# Patient Record
Sex: FEMALE | Race: OTHER | Employment: FULL TIME | URBAN - METROPOLITAN AREA
[De-identification: names, ages, dates, MRNs, and addresses within clinical notes are randomized per-mention and may not be internally consistent; named-entity substitution may affect disease eponyms.]

---

## 2022-03-16 ENCOUNTER — APPOINTMENT (OUTPATIENT)
Dept: ULTRASOUND IMAGING | Facility: HOSPITAL | Age: 22
End: 2022-03-16
Attending: EMERGENCY MEDICINE
Payer: OTHER GOVERNMENT

## 2022-03-16 ENCOUNTER — HOSPITAL ENCOUNTER (EMERGENCY)
Facility: HOSPITAL | Age: 22
Discharge: HOME | End: 2022-03-16
Attending: EMERGENCY MEDICINE
Payer: OTHER GOVERNMENT

## 2022-03-16 VITALS
DIASTOLIC BLOOD PRESSURE: 68 MMHG | WEIGHT: 150 LBS | TEMPERATURE: 97 F | RESPIRATION RATE: 18 BRPM | SYSTOLIC BLOOD PRESSURE: 116 MMHG | OXYGEN SATURATION: 99 % | HEART RATE: 65 BPM

## 2022-03-16 DIAGNOSIS — O03.9 MISCARRIAGE: Primary | ICD-10-CM

## 2022-03-16 LAB
ALBUMIN SERPL-MCNC: 3.5 G/DL (ref 3.4–5)
ALBUMIN/GLOB SERPL: 0.9 {RATIO} (ref 1–2)
ALP LIVER SERPL-CCNC: 69 U/L
ALT SERPL-CCNC: 33 U/L
ANION GAP SERPL CALC-SCNC: 4 MMOL/L (ref 0–18)
ANTIBODY SCREEN: NEGATIVE
AST SERPL-CCNC: 19 U/L (ref 15–37)
B-HCG SERPL-ACNC: 1038 MIU/ML
BASOPHILS # BLD AUTO: 0.06 X10(3) UL (ref 0–0.2)
BASOPHILS NFR BLD AUTO: 0.5 %
BILIRUB SERPL-MCNC: 0.2 MG/DL (ref 0.1–2)
BUN BLD-MCNC: 14 MG/DL (ref 7–18)
CALCIUM BLD-MCNC: 8.7 MG/DL (ref 8.5–10.1)
CHLORIDE SERPL-SCNC: 106 MMOL/L (ref 98–112)
CO2 SERPL-SCNC: 27 MMOL/L (ref 21–32)
CREAT BLD-MCNC: 0.8 MG/DL
EOSINOPHIL # BLD AUTO: 0.24 X10(3) UL (ref 0–0.7)
EOSINOPHIL NFR BLD AUTO: 2 %
ERYTHROCYTE [DISTWIDTH] IN BLOOD BY AUTOMATED COUNT: 12.5 %
GLOBULIN PLAS-MCNC: 3.7 G/DL (ref 2.8–4.4)
GLUCOSE BLD-MCNC: 108 MG/DL (ref 70–99)
HCT VFR BLD AUTO: 38 %
HGB BLD-MCNC: 13.3 G/DL
IMM GRANULOCYTES # BLD AUTO: 0.04 X10(3) UL (ref 0–1)
IMM GRANULOCYTES NFR BLD: 0.3 %
LYMPHOCYTES # BLD AUTO: 2.24 X10(3) UL (ref 1–4)
LYMPHOCYTES NFR BLD AUTO: 18.8 %
MCH RBC QN AUTO: 31.2 PG (ref 26–34)
MCHC RBC AUTO-ENTMCNC: 35 G/DL (ref 31–37)
MCV RBC AUTO: 89.2 FL
MONOCYTES # BLD AUTO: 0.69 X10(3) UL (ref 0.1–1)
MONOCYTES NFR BLD AUTO: 5.8 %
NEUTROPHILS # BLD AUTO: 8.66 X10 (3) UL (ref 1.5–7.7)
NEUTROPHILS # BLD AUTO: 8.66 X10(3) UL (ref 1.5–7.7)
NEUTROPHILS NFR BLD AUTO: 72.6 %
OSMOLALITY SERPL CALC.SUM OF ELEC: 285 MOSM/KG (ref 275–295)
PLATELET # BLD AUTO: 324 10(3)UL (ref 150–450)
POTASSIUM SERPL-SCNC: 3.8 MMOL/L (ref 3.5–5.1)
PROT SERPL-MCNC: 7.2 G/DL (ref 6.4–8.2)
RBC # BLD AUTO: 4.26 X10(6)UL
SODIUM SERPL-SCNC: 137 MMOL/L (ref 136–145)
WBC # BLD AUTO: 11.9 X10(3) UL (ref 4–11)

## 2022-03-16 PROCEDURE — 85025 COMPLETE CBC W/AUTO DIFF WBC: CPT | Performed by: EMERGENCY MEDICINE

## 2022-03-16 PROCEDURE — 86901 BLOOD TYPING SEROLOGIC RH(D): CPT | Performed by: EMERGENCY MEDICINE

## 2022-03-16 PROCEDURE — 80053 COMPREHEN METABOLIC PANEL: CPT | Performed by: EMERGENCY MEDICINE

## 2022-03-16 PROCEDURE — 99284 EMERGENCY DEPT VISIT MOD MDM: CPT

## 2022-03-16 PROCEDURE — 86850 RBC ANTIBODY SCREEN: CPT | Performed by: EMERGENCY MEDICINE

## 2022-03-16 PROCEDURE — 86900 BLOOD TYPING SEROLOGIC ABO: CPT | Performed by: EMERGENCY MEDICINE

## 2022-03-16 PROCEDURE — 84702 CHORIONIC GONADOTROPIN TEST: CPT | Performed by: EMERGENCY MEDICINE

## 2022-03-16 PROCEDURE — 76817 TRANSVAGINAL US OBSTETRIC: CPT | Performed by: EMERGENCY MEDICINE

## 2022-03-16 PROCEDURE — 76801 OB US < 14 WKS SINGLE FETUS: CPT | Performed by: EMERGENCY MEDICINE

## 2022-03-16 PROCEDURE — 36415 COLL VENOUS BLD VENIPUNCTURE: CPT

## 2022-03-16 RX ORDER — PANTOPRAZOLE SODIUM 40 MG/1
40 TABLET, DELAYED RELEASE ORAL
COMMUNITY

## 2022-03-16 NOTE — ED INITIAL ASSESSMENT (HPI)
A/O x 4. Patient is approx 10 weeks pregnant. Presents with abdominal cramping and vaginal bleeding since midnight.   Hx of miscarriage

## 2022-03-29 ENCOUNTER — TELEPHONE (OUTPATIENT)
Dept: OBGYN UNIT | Facility: HOSPITAL | Age: 22
End: 2022-03-29

## 2022-12-22 ENCOUNTER — PATIENT MESSAGE (OUTPATIENT)
Dept: FAMILY MEDICINE CLINIC | Facility: CLINIC | Age: 22
End: 2022-12-22

## 2022-12-22 ENCOUNTER — TELEMEDICINE (OUTPATIENT)
Dept: FAMILY MEDICINE CLINIC | Facility: CLINIC | Age: 22
End: 2022-12-22
Payer: OTHER GOVERNMENT

## 2022-12-22 DIAGNOSIS — G47.33 OBSTRUCTIVE SLEEP APNEA: Primary | ICD-10-CM

## 2022-12-22 DIAGNOSIS — K21.9 GASTROESOPHAGEAL REFLUX DISEASE WITHOUT ESOPHAGITIS: ICD-10-CM

## 2022-12-22 PROCEDURE — 99214 OFFICE O/P EST MOD 30 MIN: CPT | Performed by: NURSE PRACTITIONER

## 2022-12-23 NOTE — TELEPHONE ENCOUNTER
From: Nuvia Denton  To: RONALDO Medrano  Sent: 12/22/2022 11:43 AM CST  Subject: Sleep Study Results For Madyson Chappell    Please see the attached requested document. Thank you for your time!

## 2023-01-04 NOTE — PATIENT INSTRUCTIONS
Get a copy of sleep study. Consider auto therapy, if insurance doesn't cover titration study. Warned if still with sleep apnea and not using CPAP has 7 fold increased risk and heart attack, stroke, abnormal heart rhythm, and death. Increased risk of driving accidents. Advised to refrain from driving when sleepy.

## 2023-01-17 ENCOUNTER — LAB ENCOUNTER (OUTPATIENT)
Dept: LAB | Age: 23
End: 2023-01-17
Attending: NURSE PRACTITIONER
Payer: OTHER GOVERNMENT

## 2023-01-17 DIAGNOSIS — Z01.818 PREOPERATIVE EXAMINATION, UNSPECIFIED: ICD-10-CM

## 2023-01-17 DIAGNOSIS — Z11.59 SCREENING FOR VIRAL DISEASE: ICD-10-CM

## 2023-01-18 LAB — SARS-COV-2 RNA RESP QL NAA+PROBE: NOT DETECTED

## 2023-01-20 ENCOUNTER — OFFICE VISIT (OUTPATIENT)
Dept: SLEEP CENTER | Age: 23
End: 2023-01-20
Attending: NURSE PRACTITIONER
Payer: OTHER GOVERNMENT

## 2023-01-20 DIAGNOSIS — K21.9 GASTROESOPHAGEAL REFLUX DISEASE WITHOUT ESOPHAGITIS: ICD-10-CM

## 2023-01-20 DIAGNOSIS — G47.33 OBSTRUCTIVE SLEEP APNEA: ICD-10-CM

## 2023-01-20 PROCEDURE — 95811 POLYSOM 6/>YRS CPAP 4/> PARM: CPT

## 2023-01-23 ENCOUNTER — SLEEP STUDY (OUTPATIENT)
Dept: FAMILY MEDICINE CLINIC | Facility: CLINIC | Age: 23
End: 2023-01-23
Payer: OTHER GOVERNMENT

## 2023-01-23 DIAGNOSIS — G47.33 OSA (OBSTRUCTIVE SLEEP APNEA): Primary | ICD-10-CM

## 2023-01-23 PROCEDURE — 95811 POLYSOM 6/>YRS CPAP 4/> PARM: CPT | Performed by: FAMILY MEDICINE

## 2023-01-24 ENCOUNTER — TELEPHONE (OUTPATIENT)
Dept: FAMILY MEDICINE CLINIC | Facility: CLINIC | Age: 23
End: 2023-01-24

## 2023-01-24 DIAGNOSIS — G47.33 OSA (OBSTRUCTIVE SLEEP APNEA): Primary | ICD-10-CM

## 2023-01-24 NOTE — TELEPHONE ENCOUNTER
----- Message from Valorie Morin MD sent at 1/24/2023  9:25 AM CST -----  [place] PAP  Update Flow sheet. Notify pt to:   Call DME supplier to obtain machine. Recommend routine follow up to assure compliance and efficacy. Avoid alcohol, sedatives and other cns depressants that may worsen sleep apnea and disrupt normal sleep architecture. Sleep hygiene should be review to assess factors that may improve sleep quality. Weight management and regular exercise should be initiated or continued to optimal BMI. Return to Sleep Physician 2 weeks after starting CPAP to evaluate progress. Avoid dangerous activities such as driving or working heavy machinery during periods of  fatigue.

## 2023-01-27 ENCOUNTER — OFFICE VISIT (OUTPATIENT)
Dept: OBGYN CLINIC | Facility: CLINIC | Age: 23
End: 2023-01-27
Payer: OTHER GOVERNMENT

## 2023-01-27 VITALS
SYSTOLIC BLOOD PRESSURE: 118 MMHG | WEIGHT: 191.81 LBS | DIASTOLIC BLOOD PRESSURE: 83 MMHG | HEART RATE: 73 BPM | BODY MASS INDEX: 32.74 KG/M2 | HEIGHT: 64 IN

## 2023-01-27 DIAGNOSIS — Z01.419 WELL WOMAN EXAM WITH ROUTINE GYNECOLOGICAL EXAM: Primary | ICD-10-CM

## 2023-01-27 DIAGNOSIS — E28.2 PCOS (POLYCYSTIC OVARIAN SYNDROME): ICD-10-CM

## 2023-01-27 PROCEDURE — 3008F BODY MASS INDEX DOCD: CPT

## 2023-01-27 PROCEDURE — 3079F DIAST BP 80-89 MM HG: CPT

## 2023-01-27 PROCEDURE — 99385 PREV VISIT NEW AGE 18-39: CPT

## 2023-01-27 PROCEDURE — 3074F SYST BP LT 130 MM HG: CPT

## 2023-01-27 PROCEDURE — 87591 N.GONORRHOEAE DNA AMP PROB: CPT

## 2023-01-27 PROCEDURE — 87491 CHLMYD TRACH DNA AMP PROBE: CPT

## 2023-01-28 ENCOUNTER — LABORATORY ENCOUNTER (OUTPATIENT)
Dept: LAB | Age: 23
End: 2023-01-28
Payer: OTHER GOVERNMENT

## 2023-01-28 DIAGNOSIS — Z01.419 WELL WOMAN EXAM WITH ROUTINE GYNECOLOGICAL EXAM: ICD-10-CM

## 2023-01-28 DIAGNOSIS — E28.2 PCOS (POLYCYSTIC OVARIAN SYNDROME): ICD-10-CM

## 2023-01-28 LAB
ALBUMIN SERPL-MCNC: 4.1 G/DL (ref 3.4–5)
ALBUMIN/GLOB SERPL: 1 {RATIO} (ref 1–2)
ALP LIVER SERPL-CCNC: 72 U/L
ALT SERPL-CCNC: 34 U/L
ANION GAP SERPL CALC-SCNC: 5 MMOL/L (ref 0–18)
AST SERPL-CCNC: 18 U/L (ref 15–37)
BASOPHILS # BLD AUTO: 0.05 X10(3) UL (ref 0–0.2)
BASOPHILS NFR BLD AUTO: 0.5 %
BILIRUB SERPL-MCNC: 0.5 MG/DL (ref 0.1–2)
BUN BLD-MCNC: 13 MG/DL (ref 7–18)
CALCIUM BLD-MCNC: 9.4 MG/DL (ref 8.5–10.1)
CHLORIDE SERPL-SCNC: 105 MMOL/L (ref 98–112)
CHOLEST SERPL-MCNC: 188 MG/DL (ref ?–200)
CO2 SERPL-SCNC: 25 MMOL/L (ref 21–32)
CREAT BLD-MCNC: 0.84 MG/DL
EOSINOPHIL # BLD AUTO: 0.22 X10(3) UL (ref 0–0.7)
EOSINOPHIL NFR BLD AUTO: 2.3 %
ERYTHROCYTE [DISTWIDTH] IN BLOOD BY AUTOMATED COUNT: 12.1 %
FASTING PATIENT LIPID ANSWER: YES
FASTING STATUS PATIENT QL REPORTED: YES
FSH SERPL-ACNC: 6.6 MIU/ML
GFR SERPLBLD BASED ON 1.73 SQ M-ARVRAT: 101 ML/MIN/1.73M2 (ref 60–?)
GLOBULIN PLAS-MCNC: 4 G/DL (ref 2.8–4.4)
GLUCOSE BLD-MCNC: 99 MG/DL (ref 70–99)
HCT VFR BLD AUTO: 42 %
HDLC SERPL-MCNC: 39 MG/DL (ref 40–59)
HGB BLD-MCNC: 14.5 G/DL
IMM GRANULOCYTES # BLD AUTO: 0.03 X10(3) UL (ref 0–1)
IMM GRANULOCYTES NFR BLD: 0.3 %
LDLC SERPL CALC-MCNC: 114 MG/DL (ref ?–100)
LH SERPL-ACNC: 16.5 MIU/ML
LYMPHOCYTES # BLD AUTO: 2.56 X10(3) UL (ref 1–4)
LYMPHOCYTES NFR BLD AUTO: 27.2 %
MCH RBC QN AUTO: 30.8 PG (ref 26–34)
MCHC RBC AUTO-ENTMCNC: 34.5 G/DL (ref 31–37)
MCV RBC AUTO: 89.2 FL
MONOCYTES # BLD AUTO: 0.49 X10(3) UL (ref 0.1–1)
MONOCYTES NFR BLD AUTO: 5.2 %
NEUTROPHILS # BLD AUTO: 6.06 X10 (3) UL (ref 1.5–7.7)
NEUTROPHILS # BLD AUTO: 6.06 X10(3) UL (ref 1.5–7.7)
NEUTROPHILS NFR BLD AUTO: 64.5 %
NONHDLC SERPL-MCNC: 149 MG/DL (ref ?–130)
OSMOLALITY SERPL CALC.SUM OF ELEC: 280 MOSM/KG (ref 275–295)
PLATELET # BLD AUTO: 401 10(3)UL (ref 150–450)
POTASSIUM SERPL-SCNC: 4.1 MMOL/L (ref 3.5–5.1)
PROLACTIN SERPL-MCNC: 14 NG/ML
PROT SERPL-MCNC: 8.1 G/DL (ref 6.4–8.2)
RBC # BLD AUTO: 4.71 X10(6)UL
SODIUM SERPL-SCNC: 135 MMOL/L (ref 136–145)
TRIGL SERPL-MCNC: 199 MG/DL (ref 30–149)
TSI SER-ACNC: 1.54 MIU/ML (ref 0.36–3.74)
VIT D+METAB SERPL-MCNC: 15.7 NG/ML (ref 30–100)
VLDLC SERPL CALC-MCNC: 35 MG/DL (ref 0–30)
WBC # BLD AUTO: 9.4 X10(3) UL (ref 4–11)

## 2023-01-28 PROCEDURE — 83002 ASSAY OF GONADOTROPIN (LH): CPT

## 2023-01-28 PROCEDURE — 84146 ASSAY OF PROLACTIN: CPT

## 2023-01-28 PROCEDURE — 84402 ASSAY OF FREE TESTOSTERONE: CPT

## 2023-01-28 PROCEDURE — 80061 LIPID PANEL: CPT

## 2023-01-28 PROCEDURE — 83001 ASSAY OF GONADOTROPIN (FSH): CPT

## 2023-01-28 PROCEDURE — 80050 GENERAL HEALTH PANEL: CPT

## 2023-01-28 PROCEDURE — 84403 ASSAY OF TOTAL TESTOSTERONE: CPT

## 2023-01-28 PROCEDURE — 82306 VITAMIN D 25 HYDROXY: CPT

## 2023-01-30 LAB
C TRACH DNA SPEC QL NAA+PROBE: NEGATIVE
N GONORRHOEA DNA SPEC QL NAA+PROBE: NEGATIVE

## 2023-02-03 LAB
SEX HORMONE BINDING GLOBULIN: 13 NMOL/L
TESTOSTERONE -MS, BIOAVAILAB: 19.7 NG/DL
TESTOSTERONE, -MS/MS: 28 NG/DL
TESTOSTERONE, FREE -MS/MS: 6.5 PG/ML

## 2023-04-17 ENCOUNTER — OFFICE VISIT (OUTPATIENT)
Dept: FAMILY MEDICINE CLINIC | Facility: CLINIC | Age: 23
End: 2023-04-17
Payer: OTHER GOVERNMENT

## 2023-04-17 VITALS
SYSTOLIC BLOOD PRESSURE: 124 MMHG | HEART RATE: 84 BPM | BODY MASS INDEX: 31.76 KG/M2 | DIASTOLIC BLOOD PRESSURE: 78 MMHG | OXYGEN SATURATION: 98 % | RESPIRATION RATE: 16 BRPM | HEIGHT: 64 IN | WEIGHT: 186 LBS

## 2023-04-17 DIAGNOSIS — Z71.3 DIETARY COUNSELING: ICD-10-CM

## 2023-04-17 DIAGNOSIS — R35.0 URINARY FREQUENCY: ICD-10-CM

## 2023-04-17 DIAGNOSIS — E28.2 PCOS (POLYCYSTIC OVARIAN SYNDROME): Primary | ICD-10-CM

## 2023-04-17 PROBLEM — K63.5 POLYP OF COLON: Status: ACTIVE | Noted: 2019-06-18

## 2023-04-17 PROBLEM — K29.30 CHRONIC SUPERFICIAL GASTRITIS WITHOUT BLEEDING: Status: ACTIVE | Noted: 2019-07-07

## 2023-04-17 PROBLEM — K31.7 GASTRIC POLYP: Status: ACTIVE | Noted: 2019-07-07

## 2023-04-17 PROBLEM — K21.9 GASTROESOPHAGEAL REFLUX DISEASE: Status: ACTIVE | Noted: 2021-09-14

## 2023-04-17 LAB
APPEARANCE: CLEAR
BILIRUBIN: NEGATIVE
CARTRIDGE LOT#: NORMAL NUMERIC
GLUCOSE (URINE DIPSTICK): NEGATIVE MG/DL
HEMOGLOBIN A1C: 5.4 % (ref 4.3–5.6)
KETONES (URINE DIPSTICK): NEGATIVE MG/DL
LEUKOCYTES: NEGATIVE
MULTISTIX LOT#: NORMAL NUMERIC
NITRITE, URINE: NEGATIVE
OCCULT BLOOD: NEGATIVE
PH, URINE: 6 (ref 4.5–8)
PROTEIN (URINE DIPSTICK): NEGATIVE MG/DL
SPECIFIC GRAVITY: 1.02 (ref 1–1.03)
URINE-COLOR: YELLOW
UROBILINOGEN,SEMI-QN: 0.2 MG/DL (ref 0–1.9)

## 2023-04-17 PROCEDURE — 3074F SYST BP LT 130 MM HG: CPT | Performed by: PHYSICIAN ASSISTANT

## 2023-04-17 PROCEDURE — 3078F DIAST BP <80 MM HG: CPT | Performed by: PHYSICIAN ASSISTANT

## 2023-04-17 PROCEDURE — 99203 OFFICE O/P NEW LOW 30 MIN: CPT | Performed by: PHYSICIAN ASSISTANT

## 2023-04-17 PROCEDURE — 81003 URINALYSIS AUTO W/O SCOPE: CPT | Performed by: PHYSICIAN ASSISTANT

## 2023-04-17 PROCEDURE — 3008F BODY MASS INDEX DOCD: CPT | Performed by: PHYSICIAN ASSISTANT

## 2023-04-17 PROCEDURE — 83036 HEMOGLOBIN GLYCOSYLATED A1C: CPT | Performed by: PHYSICIAN ASSISTANT

## 2023-04-17 RX ORDER — TOPIRAMATE 25 MG/1
TABLET ORAL
COMMUNITY
Start: 2023-01-29 | End: 2023-04-17 | Stop reason: ALTCHOICE

## 2023-04-17 RX ORDER — PRAZOSIN HYDROCHLORIDE 1 MG/1
CAPSULE ORAL
COMMUNITY
Start: 2023-01-29 | End: 2023-04-17 | Stop reason: ALTCHOICE

## 2023-04-17 NOTE — PROGRESS NOTES
Subjective:   Patient ID: Godfrey Solares is a 25year old female. HPI   Patient presents to establish care and concerns regarding insulin resistance  Mom gestational, DM2, HTN, hyperlipidemia  Brother is prediabetes, HTN    Increased urinary frequency 7-8 months  No burning  More urgency  No blood in urine  Some pain and pressure    Dx: JUANA on CPAP, PCOS  Shin splints  No surgery    Diet: h/o gastroparesis and GERD so hard to maintain a good diet, she does a lot of soups, smoothies  Exercise: cardio and strength  Was in [employer] for 4 years  Sleep: getting used to CPAP, hard to find the right mask        History/Other:   Review of Systems   Constitutional: Negative for chills, fatigue and fever. HENT: Negative for congestion, ear pain, rhinorrhea and sore throat. Eyes: Negative for visual disturbance. Respiratory: Negative for cough, shortness of breath and wheezing. Cardiovascular: Negative for chest pain, palpitations and leg swelling. Gastrointestinal: Negative for abdominal pain, diarrhea, nausea and vomiting. Genitourinary: Positive for frequency and urgency. Negative for dysuria and hematuria. Musculoskeletal: Negative for arthralgias, gait problem and myalgias. Skin: Negative for rash. Neurological: Negative for weakness, light-headedness and headaches. Hematological: Negative for adenopathy. Psychiatric/Behavioral: Negative for dysphoric mood. The patient is not nervous/anxious. No current outpatient medications on file. Allergies:  Cat Hair Extract        DIZZINESS, FACE FLUSHING, HIVES,                            ITCHING, PAIN, RASH, RESTLESSNESS  Seasonal                FATIGUE, ITCHING, NAUSEA ONLY    Objective:   Physical Exam  Vitals and nursing note reviewed. Constitutional:       General: She is not in acute distress. Appearance: She is well-developed. HENT:      Head: Normocephalic and atraumatic.       Right Ear: External ear normal.      Left Ear: External ear normal.      Nose: Nose normal.   Eyes:      Conjunctiva/sclera: Conjunctivae normal.      Pupils: Pupils are equal, round, and reactive to light. Neck:      Thyroid: No thyromegaly. Cardiovascular:      Rate and Rhythm: Normal rate and regular rhythm. Heart sounds: Normal heart sounds. Pulmonary:      Effort: Pulmonary effort is normal.      Breath sounds: Normal breath sounds. No wheezing or rales. Abdominal:      General: Bowel sounds are normal. There is no distension. Palpations: Abdomen is soft. There is no mass. Tenderness: There is no abdominal tenderness. Musculoskeletal:         General: No tenderness. Normal range of motion. Cervical back: Normal range of motion and neck supple. Lymphadenopathy:      Cervical: No cervical adenopathy. Skin:     General: Skin is warm and dry. Findings: No rash. Neurological:      Mental Status: She is alert and oriented to person, place, and time. Psychiatric:         Behavior: Behavior normal.         Assessment & Plan:   1. PCOS (polycystic ovarian syndrome)  A1c in office normal at 5.4. Not currently on birth control but open to discussing more in the future. - HEMOGLOBIN A1C    2. Dietary counseling  Has h/o GI issues and PCOS and confused about what time of diet she should consume. Will refer to dietician for help with navigating this. - OP REFERRAL TO WEIGH LOSS CLINIC DIETITIAN    3. Urinary frequency  UA negative. Recommend increasing fluids as much as she can and to monitor. Follow up if symptoms worsen or if anything new develops.    - URINALYSIS, AUTO, W/O SCOPE

## 2023-04-20 ENCOUNTER — OFFICE VISIT (OUTPATIENT)
Dept: GASTROENTEROLOGY | Facility: CLINIC | Age: 23
End: 2023-04-20

## 2023-04-20 VITALS
WEIGHT: 182 LBS | DIASTOLIC BLOOD PRESSURE: 99 MMHG | HEIGHT: 64 IN | BODY MASS INDEX: 31.07 KG/M2 | SYSTOLIC BLOOD PRESSURE: 145 MMHG | HEART RATE: 105 BPM

## 2023-04-20 DIAGNOSIS — R10.9 ABDOMINAL PAIN, UNSPECIFIED ABDOMINAL LOCATION: Primary | ICD-10-CM

## 2023-04-20 DIAGNOSIS — R11.2 NAUSEA AND VOMITING, UNSPECIFIED VOMITING TYPE: ICD-10-CM

## 2023-04-20 DIAGNOSIS — K21.9 GASTROESOPHAGEAL REFLUX DISEASE, UNSPECIFIED WHETHER ESOPHAGITIS PRESENT: ICD-10-CM

## 2023-04-20 DIAGNOSIS — K62.5 BRBPR (BRIGHT RED BLOOD PER RECTUM): ICD-10-CM

## 2023-04-20 DIAGNOSIS — R19.7 DIARRHEA, UNSPECIFIED TYPE: ICD-10-CM

## 2023-04-20 DIAGNOSIS — R14.0 BLOATING: ICD-10-CM

## 2023-04-20 PROCEDURE — 3008F BODY MASS INDEX DOCD: CPT | Performed by: NURSE PRACTITIONER

## 2023-04-20 PROCEDURE — 3077F SYST BP >= 140 MM HG: CPT | Performed by: NURSE PRACTITIONER

## 2023-04-20 PROCEDURE — 3080F DIAST BP >= 90 MM HG: CPT | Performed by: NURSE PRACTITIONER

## 2023-04-20 PROCEDURE — 99204 OFFICE O/P NEW MOD 45 MIN: CPT | Performed by: NURSE PRACTITIONER

## 2023-04-21 ENCOUNTER — HOSPITAL ENCOUNTER (OUTPATIENT)
Dept: GENERAL RADIOLOGY | Age: 23
Discharge: HOME | End: 2023-04-21
Attending: NURSE PRACTITIONER
Payer: COMMERCIAL

## 2023-04-21 DIAGNOSIS — K62.5 BRBPR (BRIGHT RED BLOOD PER RECTUM): ICD-10-CM

## 2023-04-21 DIAGNOSIS — R14.0 BLOATING: ICD-10-CM

## 2023-04-21 DIAGNOSIS — R19.7 DIARRHEA, UNSPECIFIED TYPE: ICD-10-CM

## 2023-04-21 DIAGNOSIS — R10.9 ABDOMINAL PAIN, UNSPECIFIED ABDOMINAL LOCATION: ICD-10-CM

## 2023-04-21 DIAGNOSIS — R11.2 NAUSEA AND VOMITING, UNSPECIFIED VOMITING TYPE: ICD-10-CM

## 2023-04-21 PROCEDURE — 74018 RADEX ABDOMEN 1 VIEW: CPT | Performed by: NURSE PRACTITIONER

## 2023-04-24 ENCOUNTER — TELEPHONE (OUTPATIENT)
Dept: GASTROENTEROLOGY | Facility: CLINIC | Age: 23
End: 2023-04-24

## 2023-04-24 RX ORDER — POLYETHYLENE GLYCOL 3350, SODIUM CHLORIDE, SODIUM BICARBONATE, POTASSIUM CHLORIDE 420; 11.2; 5.72; 1.48 G/4L; G/4L; G/4L; G/4L
POWDER, FOR SOLUTION ORAL
Qty: 4000 ML | Refills: 0 | Status: SHIPPED | OUTPATIENT
Start: 2023-04-24

## 2023-04-26 ENCOUNTER — LAB ENCOUNTER (OUTPATIENT)
Dept: LAB | Age: 23
End: 2023-04-26
Attending: NURSE PRACTITIONER
Payer: COMMERCIAL

## 2023-04-26 DIAGNOSIS — R19.7 DIARRHEA, UNSPECIFIED TYPE: ICD-10-CM

## 2023-04-26 DIAGNOSIS — R14.0 BLOATING: ICD-10-CM

## 2023-04-26 DIAGNOSIS — R11.2 NAUSEA AND VOMITING, UNSPECIFIED VOMITING TYPE: ICD-10-CM

## 2023-04-26 DIAGNOSIS — R10.9 ABDOMINAL PAIN, UNSPECIFIED ABDOMINAL LOCATION: ICD-10-CM

## 2023-04-26 DIAGNOSIS — K21.9 GASTROESOPHAGEAL REFLUX DISEASE, UNSPECIFIED WHETHER ESOPHAGITIS PRESENT: ICD-10-CM

## 2023-04-26 DIAGNOSIS — K62.5 BRBPR (BRIGHT RED BLOOD PER RECTUM): ICD-10-CM

## 2023-04-26 LAB
FOLATE SERPL-MCNC: 5.8 NG/ML (ref 8.7–?)
VIT B12 SERPL-MCNC: 387 PG/ML (ref 193–986)

## 2023-04-26 PROCEDURE — 82746 ASSAY OF FOLIC ACID SERUM: CPT

## 2023-04-26 PROCEDURE — 36415 COLL VENOUS BLD VENIPUNCTURE: CPT

## 2023-04-26 PROCEDURE — 82607 VITAMIN B-12: CPT

## 2023-04-30 ENCOUNTER — LAB ENCOUNTER (OUTPATIENT)
Dept: LAB | Facility: HOSPITAL | Age: 23
End: 2023-04-30
Attending: NURSE PRACTITIONER
Payer: COMMERCIAL

## 2023-04-30 DIAGNOSIS — R10.9 ABDOMINAL PAIN, UNSPECIFIED ABDOMINAL LOCATION: ICD-10-CM

## 2023-04-30 DIAGNOSIS — R11.2 NAUSEA AND VOMITING, UNSPECIFIED VOMITING TYPE: ICD-10-CM

## 2023-04-30 DIAGNOSIS — K21.9 GASTROESOPHAGEAL REFLUX DISEASE, UNSPECIFIED WHETHER ESOPHAGITIS PRESENT: ICD-10-CM

## 2023-04-30 DIAGNOSIS — R14.0 BLOATING: ICD-10-CM

## 2023-04-30 DIAGNOSIS — R19.7 DIARRHEA, UNSPECIFIED TYPE: ICD-10-CM

## 2023-04-30 DIAGNOSIS — K62.5 BRBPR (BRIGHT RED BLOOD PER RECTUM): ICD-10-CM

## 2023-04-30 LAB
CRYPTOSP AG STL QL IA: NEGATIVE
G LAMBLIA AG STL QL IA: NEGATIVE

## 2023-04-30 PROCEDURE — 87427 SHIGA-LIKE TOXIN AG IA: CPT

## 2023-04-30 PROCEDURE — 87329 GIARDIA AG IA: CPT

## 2023-04-30 PROCEDURE — 87493 C DIFF AMPLIFIED PROBE: CPT

## 2023-04-30 PROCEDURE — 87272 CRYPTOSPORIDIUM AG IF: CPT

## 2023-04-30 PROCEDURE — 83993 ASSAY FOR CALPROTECTIN FECAL: CPT

## 2023-04-30 PROCEDURE — 87338 HPYLORI STOOL AG IA: CPT

## 2023-04-30 PROCEDURE — 87045 FECES CULTURE AEROBIC BACT: CPT

## 2023-04-30 PROCEDURE — 87046 STOOL CULTR AEROBIC BACT EA: CPT

## 2023-05-01 LAB
C DIFF TOX B STL QL: NEGATIVE
CALPROTECTIN STL-MCNT: 54.3 ΜG/G (ref ?–50)
H PYLORI AG STL QL IA: NEGATIVE

## 2023-05-04 ENCOUNTER — OFFICE VISIT (OUTPATIENT)
Dept: INTERNAL MEDICINE CLINIC | Facility: CLINIC | Age: 23
End: 2023-05-04
Payer: COMMERCIAL

## 2023-05-04 VITALS
WEIGHT: 182 LBS | RESPIRATION RATE: 16 BRPM | SYSTOLIC BLOOD PRESSURE: 120 MMHG | DIASTOLIC BLOOD PRESSURE: 78 MMHG | HEIGHT: 62 IN | OXYGEN SATURATION: 98 % | HEART RATE: 79 BPM | BODY MASS INDEX: 33.49 KG/M2

## 2023-05-04 DIAGNOSIS — F41.9 ANXIETY AND DEPRESSION: ICD-10-CM

## 2023-05-04 DIAGNOSIS — F32.A ANXIETY AND DEPRESSION: ICD-10-CM

## 2023-05-04 DIAGNOSIS — Z51.81 THERAPEUTIC DRUG MONITORING: Primary | ICD-10-CM

## 2023-05-04 DIAGNOSIS — G47.33 OSA ON CPAP: ICD-10-CM

## 2023-05-04 DIAGNOSIS — E55.9 VITAMIN D DEFICIENCY: ICD-10-CM

## 2023-05-04 DIAGNOSIS — Z99.89 OSA ON CPAP: ICD-10-CM

## 2023-05-04 DIAGNOSIS — E66.9 OBESITY (BMI 30-39.9): ICD-10-CM

## 2023-05-04 DIAGNOSIS — K21.00 GASTROESOPHAGEAL REFLUX DISEASE WITH ESOPHAGITIS, UNSPECIFIED WHETHER HEMORRHAGE: ICD-10-CM

## 2023-05-04 PROCEDURE — 3074F SYST BP LT 130 MM HG: CPT | Performed by: NURSE PRACTITIONER

## 2023-05-04 PROCEDURE — 99214 OFFICE O/P EST MOD 30 MIN: CPT | Performed by: NURSE PRACTITIONER

## 2023-05-04 PROCEDURE — 3008F BODY MASS INDEX DOCD: CPT | Performed by: NURSE PRACTITIONER

## 2023-05-04 PROCEDURE — 3078F DIAST BP <80 MM HG: CPT | Performed by: NURSE PRACTITIONER

## 2023-05-04 RX ORDER — ERGOCALCIFEROL 1.25 MG/1
50000 CAPSULE ORAL
Qty: 32 CAPSULE | Refills: 0 | Status: SHIPPED | OUTPATIENT
Start: 2023-05-04

## 2023-05-04 NOTE — PATIENT INSTRUCTIONS
Welcome to the [facility]!!  Thank you for placing your trust in our health care team, I look forward to working with you along this journey to better health! Next steps:     1.  Schedule a personal nutrition consultation with one of our registered dieticians. Bring along your food journal (3 days minimum). See journal options below. 2.  Fill your prescribed medication and take as discussed and prescribed: ergocaliferol (high dose vitamin d- 1 tablet 2 times per week x 16 weeks)     Please try to work on the following dietary changes this first month:  Daily protein recommendation to start:  grams  Daily carbohydrate: <100g  Daily calories: 1,200-1,300  1. Drink water with meals and throughout the day, cut down on soda and/or juice if consumed. Consider flavored water options like Bubbly, Spindrift, Hint and Kiki. 2.  Eat breakfast daily and focus on having protein with each meal, examples include: greek yogurt, cottage cheese, hard boiled egg, whole grain toast with peanut butter. 3.  Reduce refined carbohydrates and sugars which includes items such as sweets, as well as rice, pasta, and bread and make sure to choose whole grain options when having them with just 1 serving per meal about the size of your inner palm. 4.  Consume non starchy veggies daily working towards making them a good 50% of your daily food intake. Add them to lunch and dinner consistently. 5.  Start a daily probiotic: VSL#3 is recommended, (order on line at www.vsl3. com). Take 1 capsule daily with water for 30 days, then reduce to 1 every other day (this will reduce the cost). Capsules can be left out for 2 weeks, but then must be refrigerated. Please download jaskaran My Fitness Sybil Reading! Or Net Diary to monitor daily dietary intake and you will be able to see if you are eating the right amount of calories or too much or too little which would hinder weight loss.  Additionally this will help to see your daily carbohydrate and protein intake. When you set the lydia up choose 1-2 lbs/week as a goal.  Keeping a paper food journal is an option as well to remain accountable for your choices- this is the start to mindful eating! A low calorie diet has been consistently shown to support weight loss. Continue or start exercising to help establish a routine. If not already exercising begin with 1 day and progress as able with long-term goal of 30 minutes 5 days a week at a minimum. Meditation daily can help manage and control stress. Chronic stress can make weight loss difficult. Exercising is one way to help with stress, but meditation using the CALM Lydia or another comparable alternative can be done in your home or place of work with little time commitment. This Lydia can also help work on behavior change and improve sleep. Check out the segment under Calm Masterclass and listen to The 4 Pillars of Health. A great way to begin learning about the foundation of lifestyle with practical tips to use in your every day. Check out www.yourweightmatters. org blog for continued daily support and education along this weight loss journey! Patient Resources:     Personal Training/Fitness Classes/Health Coaching     [facility] and [place] @ [email or web address] Full fitness center with group fitness and personal training. Discount available as client of [facility]. Health Coaching and Personal Training with Thelma Jimenez at our [employer]- individual weekly coaching with option to add personal training and small group fitness classes targeted at weight loss- [phone] and/or email @ [email or web address]  360FIT Benjie [email or web address] Group Fitness [phone] and/or email Francisca Sheppard at [email or web address]  [address] with multiple locations: Goran (www.The ADEX), Eat The Frog Fitness (www.eatAgile Therapeutics. PRX Control Solutions), Fit Body Bootcamp (www.fitbodybootIntransap.PRX Control Solutions), SoZo Global Fitness (www.dentaZOOM), The Exercise  (www.exercisecoach.PRX Control Solutions)     Online Fitness  Fitness  on [employer] in 10 DVD series- www. koahy55IKM. PRX Control Solutions  Sit and Be Fit - Chair exercise series Www.sitandbefit. org  Hip Hop Fit with Wicho Schaefer at www.hiphopfit. net     Apps for on the [employer] 7 Minute Workout (orange box with white 7) - free on the go HIIT training jaskaran  Peloton Jaskaran @ NuFlick     Nutrition Trackers and Tools  LoseIT! And My Fitness Pal apps and on line for tracking nutrition  NOOM - virtual health coaching  FitFoundation (healthy meals on the go) in [facility] @ www. vcvfqsxmwdvzq9t. Vaishali Perez MD @ wwwJRapid and Rosalinda Barron (keto and low carb plans recommended) @ www. HHCERP51.LNX, Metabolic Meals @ www. MyMetabolicMeals. com - individual prepared meals to go  [facility], [facility], [facility]- on line meal delivery programs for preparation at home  [place] FrameBlast in [place] for homemade meals to go @ www"H[employer]mealInnovega. PRX Control Solutions  Diet Doctor @ www. dietdoctor. com - low carb swaps  PowerPlan - meal prep and planning jaskaran (www.yummly. com)     Stress Management/Behavior/Mindful Eating  CALM meditation jaskaran (www.calm. PRX [employer] Solutions)  Headspace  Am I Hungry? Mindful eating virtual  jaskaran  Www.yourweightmatters. org - Obesity Action Coalition sponsored Blog posts daily  Motivation jaskaran (black box with white \")- daily supportive messages sent to your phone     Books/Video Education/Podcasts  Mindless Eating by Pam Cason  Why We Get Sick by Mercedes Ness (a book about insulin resistance)  Atomic Habits by Maame Prado (a book about taking small steps to promote greater behavior change)   Can't Hurt Me by Debo Patel (a book exploring the power of discipline in achieving your goals)  The End of Dieting:  How to Live for Life by Dr. Evelio Acosta M.D. or listen to The [facility] Episode 63: Understanding \"Nutritarian\" Eating w/Dr. Elen Tran  Your Body in Balance: The Frontify of Food, Hormones, and Health by Dr. Kat Hall  The Menopause Diet Plan by Glenn Magana and [facility] AT [facility]  The Complete Guide to fasting by Dr. Ayleen Peres, [address] [facility] by Keya Waterman, Ph.D, R.D. Weight Loss Surgery Will Not Treat Food Addiction by Cooper Robbins Ph.D  The [address] [place] [address] on plant based nutrition  Fed Up - documentary about obesity (Free on CommunityForce)  The Truth About Sugar - documentary on sugar (Free on Intematix, https://youtu. be/8N6iitxIM3v)  The Dr. Migdalia Brewer by Dr. Emi Barton MD  Fitlosophy Fitspiration - journal to better health (found at [employer] in fitness aisle)  What Happened to You?- a look at the impact trauma has on behavior written by Elli Morales and Dr. Ricky Mota Again by Kimo Boyd - discovering your true self after trauma  Fidel Buchanan talk on Groopt, The Call to Courage  Podcasts: The Exam Room by the Physician's Committee, Nutrition Facts by Dr. Andre Wray    We are here to support you with weight loss, but please remember that you still need your primary care provider for your routine health maintenance.

## 2025-03-21 ENCOUNTER — OFFICE VISIT (OUTPATIENT)
Facility: CLINIC | Age: 25
End: 2025-03-21
Payer: COMMERCIAL

## 2025-03-21 VITALS
WEIGHT: 180 LBS | SYSTOLIC BLOOD PRESSURE: 138 MMHG | DIASTOLIC BLOOD PRESSURE: 88 MMHG | HEIGHT: 62 IN | BODY MASS INDEX: 33.13 KG/M2

## 2025-03-21 DIAGNOSIS — Z30.011 ENCOUNTER FOR BCP (BIRTH CONTROL PILLS) INITIAL PRESCRIPTION: ICD-10-CM

## 2025-03-21 DIAGNOSIS — Z01.419 WELL WOMAN EXAM WITH ROUTINE GYNECOLOGICAL EXAM: Primary | ICD-10-CM

## 2025-03-21 DIAGNOSIS — Z87.42 HISTORY OF PCOS: ICD-10-CM

## 2025-03-21 DIAGNOSIS — R10.2 ADNEXAL TENDERNESS, LEFT: ICD-10-CM

## 2025-03-21 DIAGNOSIS — Z11.3 ROUTINE SCREENING FOR STI (SEXUALLY TRANSMITTED INFECTION): ICD-10-CM

## 2025-03-21 LAB
CONTROL LINE PRESENT WITH A CLEAR BACKGROUND (YES/NO): YES YES/NO
KIT LOT #: NORMAL NUMERIC
PREGNANCY TEST, URINE: NEGATIVE

## 2025-03-21 PROCEDURE — 3075F SYST BP GE 130 - 139MM HG: CPT

## 2025-03-21 PROCEDURE — 87491 CHLMYD TRACH DNA AMP PROBE: CPT

## 2025-03-21 PROCEDURE — 3008F BODY MASS INDEX DOCD: CPT

## 2025-03-21 PROCEDURE — 99395 PREV VISIT EST AGE 18-39: CPT

## 2025-03-21 PROCEDURE — 87591 N.GONORRHOEAE DNA AMP PROB: CPT

## 2025-03-21 PROCEDURE — 3079F DIAST BP 80-89 MM HG: CPT

## 2025-03-21 PROCEDURE — 81025 URINE PREGNANCY TEST: CPT

## 2025-03-21 RX ORDER — DROSPIRENONE AND ESTETROL 3-14.2(28)
1 KIT ORAL DAILY
Qty: 84 TABLET | Refills: 0 | Status: SHIPPED | OUTPATIENT
Start: 2025-03-21 | End: 2025-04-20

## 2025-03-21 RX ORDER — DROSPIRENONE AND ESTETROL 3-14.2(28)
1 KIT ORAL DAILY
Qty: 28 TABLET | Refills: 0 | COMMUNITY
Start: 2025-03-21 | End: 2025-04-20

## 2025-03-21 NOTE — PATIENT INSTRUCTIONS
Oral contraceptive hormonal pill information and precautions for the women [facility]    Possible benefits of oral contraceptives    -To 90% effective in preventing pregnancy, which means in 100 women who start out the year using the pill and use it consistently, the lowest failure rate at the end of the year will be 1  -Decreased menstrual cramps  -Decreased menstrual bleeding  -More regular menstrual bleeding  -Decrease pain at the time of ovulation  -Less risk of pelvic inflammatory disease  -Improvement in acne after the first 1 to 2 months of use  -Decreased risk of ovarian cancer  -Decreased risk of uterine cancer  -Decreased risk of colon cancer    Possible risks of oral contraceptives:     Major risks:    -Blood clots in the legs or lungs  -Stroke or heart attack  -Gallbladder disease  -Liver tumors  -Death and high risk patients  -Slight increased risk of breast cancer although this risk is extremely small    Minor risks:  -Nausea  -Irregular spotting between periods  -Increased menstrual bleeding  -Breast tenderness  -Possible weight gain related to increase in dietary habits  -Headache  -Depression  -High blood pressure  -Hyperpigmentation or darkening of the skin of the face  -Worsening acne  -Increased propensity for yeast infections    The Majority of the serious complications and pill users occurs in women over the age of 30 who smoke or have other high risk factors for blood clotting    General Precautions and What If's    If you are taking antibiotics, use a back-up method of contraception while on the antibiotics.  Some antibiotics may interfere with the effectiveness of the birth control pill    Taking your pills:  Take your first pill on the first Sunday after your menstrual period begins.  If your period begins on Sunday begin that day  Take 1 pill every day around the same time  Member to use another method of birth control for the first month to take the pill  2.  Should begin sometime  during the last week of your pills if you are using a 28-day pack, or sometime during the first 7 days you are not taking your pills if you are using a 21-day pack.    Missed pills:  If you miss 1 pill take the missed pill as soon as you remember and take your regular pill at the usual time  If you missed 2 pills in a row take 2 pills as soon as you remember and 2 more the next day.  Use another method of birth control until you finish that packet  If you miss 3 or more pills you are not protected use another form of birth control weight to your next.  And restart a new pack on the Sunday that follows    Stopping pills:  May stop the pill at the end of any pack must use another form of birth control if you wish to avoid pregnancy  You may try to conceive after the first cycle off the hormonal contraception  If your periods were irregular heavy or very painful before taking the pill they may return to this pattern    Danger signs  The abdominal pain/severe  C chest pain/severe cough or shortness of breath  H headache severe dizziness weakness or numbness  GI problems vision loss or blurring speech problems  S severe leg cramps calf or thigh  Call us immediately if any of these danger signs occur

## 2025-03-21 NOTE — PROGRESS NOTES
GYN H&P     Genetic questionnaire reviewed with the patient and she will be referred for genetic counseling if the questionnaire had any positive results.    The [facility] intake form was also reviewed regarding contraception, menstrual periods, urinary health, and vaginal / sexual health    3/21/2025  5:02 PM    Chief Complaint   Patient presents with    Physical     Pt is worried she may have hashimoto's or endometriosis due to heavy period and clotting. States she had heart pain with the last two periods.       HPI: Sandrita is a 24 year old  Patient's last menstrual period was 2025 (exact date).  (contraception: none) here for her annual gyn exam.     Hx of PCOS and irregular periods. States that over the past 3 years or so, she has noticed her periods getting \"worse\" in terms of being more inconsistent with more clots and pain. States the flow has remained the same. Reports the last two periods, she had \"heart pain\" - did not go to the ER at that time and states the pain spontaneously resolved when her period ended. Recently found out her mother had a hysterectomy due to a history of endometriosis so wondering if she has the same. Was on OCPs in the past from age [age] but reports unwanted side effects including acne, mood swings, and inconsistent periods so she stopped taking them. Considering pregnancy in the next 5 years or so but wants to focus on her health first. Denies any pelvic or breast complaints. Also wondering about Hoshimotos - states someone told her her symptoms may be due to this so was wondering if it is true. Had her TSH checked 2024 which was normal.     Reports a hx of ovarian cysts - states when she had an ultrasound following her most recent miscarriage, they noted a cyst on her left ovary. She has not had any follow up ultrasounds after that but does report persistent left adnexal pain.    Requesting full panel STD testing today.    Previous encounters and  chart reviewed.     OB History    Para Term  AB Living   2       2     SAB IAB Ectopic Multiple Live Births   2              # Outcome Date GA Lbr Aaron/2nd Weight Sex Type Anes PTL Lv   2 SAB  10w0d          1 SAB  13w0d              GYN hx:   Menarche: 15  Period Cycle (Days): 28-90  Period Duration (Days): 4-9  Period Flow: heavy  Use of Birth Control (if yes, specify type): None  Pap Date: 23  Pap Result Notes: negative  Follow Up Recommendation:       Past Medical History:    Esophageal reflux     History reviewed. No pertinent surgical history.  Allergies[1]  Medications Ordered Prior to Encounter[2]  History reviewed. No pertinent family history.  Social History     Socioeconomic History    Marital status:      Spouse name: Not on file    Number of children: Not on file    Years of education: Not on file    Highest education level: Not on file   Occupational History    Not on file   Tobacco Use    Smoking status: Never     Passive exposure: Never    Smokeless tobacco: Never   Vaping Use    Vaping status: Never Used   Substance and Sexual Activity    Alcohol use: Not Currently    Drug use: Never    Sexual activity: Not on file   Other Topics Concern    Not on file   Social History Narrative    Not on file     Social Drivers of Health     Food Insecurity: Not on file   Transportation Needs: Not on file   Stress: Not on file   Housing Stability: Low Risk  (2021)    Received from [facility]    Housing Stability     Mortgage Payment Concerns?: Not on file     Number of Places Lived in the Last Year: Not on file     Unstable Housing?: Not on file       ROS:     Review of Systems:  General: denies fevers, chills, fatigue and malaise.   Eyes: no visual changes, denies headaches  ENT: no complaints, denies earaches, runny nose, epistaxis, throat pain or sore throat  Respiratory: denies SOB, dyspnea, cough or wheezing  Cardiovascular: denies chest pain, palpitations,  exercise intolerance   GI: denies abdominal pain, diarrhea, constipation  : no complaints, denies dysuria, increased urinary frequency. Menses as above, + dysmenorrhea, no menorrhagia, no dyspareunia   Hematological/lymphatic: denies history of excessive bleeding or bruising, denies dizziness, lightheadedness.   Breast: denies rashes, skin changes, pain, lumps or discharge   Psychiatric: denies depression, changes in sleep patterns, anxiety  Endocrine: denies hot or cold intolerance, mood changes   Neurological: denies changes in sight, smell, hearing or taste. Denies seizures or tremors  Immunological: denies allergies, denies anaphylaxis, or swollen lymph nodes  Musculoskeletal: denies joint pain, morning stiffness, decreased range of motion         O /88   Ht 62\"   Wt 180 lb (81.6 kg)   LMP 03/08/2025 (Exact Date)   BMI 32.92 kg/m²         Wt Readings from Last 6 Encounters:   03/21/25 180 lb (81.6 kg)   05/04/23 182 lb (82.6 kg)   04/20/23 182 lb (82.6 kg)   04/17/23 186 lb (84.4 kg)   01/27/23 191 lb 12.8 oz (87 kg)   03/16/22 150 lb (68 kg)     Exam:   GENERAL: well developed, well nourished, in no apparent distress, oriented.  SKIN: no rashes, no suspicious lesions  HEENT: normal  NECK: supple; no thyromegaly, no adenopathy  LUNGS: clear to auscultation  CARDIOVASCULAR: normal S1, S2, RRR  BREASTS: soft, nontender, no palpable masses or nodes, no nipple discharge, no skin changes, no axillary adenopathy  ABDOMEN: no scars,  soft, non distended; non tender, no masses  PELVIC: External Genitalia: Normal appearing, no lesions.    Vagina: normal pink mucosa, no lesions, normal clear discharge.    Bladder well supported.  No  anterior or posterior hernias    Cervix: nulliparous, no lesions , No CMT     Uterus:  mobile, non tender, normal size    Adnexa: +left adnexal tenderness, no masses, normal size    Rectal: deferred  EXTREMITIES:  non tender without edema        A/P: Patient is 24 year old female  with no complaints. Here for well woman exam.            Patient counseled on:    Diet/exercise.      Self Breast Exams     Safe sex practices / and living environment     Vaccines:  Annual Flu          Pap: neg/neg - Year:  1/2023  GC/Chlamydia:  collected via urine today  Mammogram:  na   Dexa:  na  Colonoscopy / Cologuard:   na  Lipid / Cholesterol:  has appointment with a new PCP next week      Meds This Visit:    Requested Prescriptions     Signed Prescriptions Disp Refills    Drospirenone-Estetrol (NEXTSTELLIS) 3-14.2 MG Oral Tab 28 tablet 0     Sig: Take 1 tablet by mouth daily.    Drospirenone-Estetrol (NEXTSTELLIS) 3-14.2 MG Oral Tab 84 tablet 0     Sig: Take 1 tablet by mouth daily.       1. Routine screening for STI (sexually transmitted infection)  - Chlamydia/Gc Amplification; Future  - Chlamydia/Gc Amplification  - HIV Ag/Ab Combo; Future  - T Pallidum Screening Cascade; Future  - HCV Antibody; Future  - Hepatitis B Surface Antigen; Future    2. Adnexal tenderness, left    3. History of PCOS  - Drospirenone-Estetrol (NEXTSTELLIS) 3-14.2 MG Oral Tab; Take 1 tablet by mouth daily.  Dispense: 28 tablet; Refill: 0  - Drospirenone-Estetrol (NEXTSTELLIS) 3-14.2 MG Oral Tab; Take 1 tablet by mouth daily.  Dispense: 84 tablet; Refill: 0    4. Well woman exam with routine gynecological exam    5. Encounter for BCP (birth control pills) initial prescription  - Urine Preg Test [42234]  - Drospirenone-Estetrol (NEXTSTELLIS) 3-14.2 MG Oral Tab; Take 1 tablet by mouth daily.  Dispense: 28 tablet; Refill: 0  - Drospirenone-Estetrol (NEXTSTELLIS) 3-14.2 MG Oral Tab; Take 1 tablet by mouth daily.  Dispense: 84 tablet; Refill: 0    Discussed PCOS vs endometriosis, all questions answered. Reviewed that diagnosis of endometriosis can only be made through laproscopy but symptoms of dysmenorrhea and menorrhagia can be controlled with hormonal contraceptives. Discussed contraceptive options, pt opts to try OCPs - sample of  Nextelltis given and rx sent to pharmacy. Discussed starting after next period, common side effects and ACHES reviewed    Patient's most recent TSH normal - no other s/s suggestive of Hoshimotos. Scheduled for annual exam with PCP in the coming weeks, encouraged to follow up at that time    Plan pelvic ultrasound for hx of ovarian cyst and left adnexal tenderness    Return in about 3 weeks (around 4/11/2025) for ultrasound, 3 month OCP follow up.    Angie Nunes, APRN   3/21/2025  5:02 PM       This note was created by [employer] voice recognition. Errors in content may be related to improper recognition by the system; efforts to review and correct have been done but errors may still exist. Please contact me with any questions.    Note to patient and family   The 21st Century Cures Act makes medical notes available to patients in the interest of transparency.  However, please be advised that this is a medical document.  It is intended as [id number]-[id number] communication.  It is written in medical language which may contain abbreviations or verbiage that are technical and unfamiliar.  It may appear blunt or direct.  Medical documents are intended to carry relevant information, facts as evident, and the clinical opinion of the practitioner.           [1]   Allergies  Allergen Reactions    Cat Hair Extract DIZZINESS, FACE FLUSHING, HIVES, ITCHING, PAIN, RASH and RESTLESSNESS    Seasonal FATIGUE, ITCHING and NAUSEA ONLY   [2]   Current Outpatient Medications on File Prior to Visit   Medication Sig Dispense Refill    ergocalciferol 1.25 MG (54601 UT) Oral Cap Take 1 capsule (50,000 Units total) by mouth twice a week. 32 capsule 0    PEG 3350-KCl-Na Bicarb-NaCl (TRILYTE) 420 g Oral Recon Soln Take prep as directed by gastro office. May substitute with Trilyte/generic equivalent if needed. 4000 mL 0    cholecalciferol 125 MCG (5000 UT) Oral Tab Take 1 tablet (5,000 Units total) by mouth daily.       No current  facility-administered medications on file prior to visit.

## 2025-03-24 LAB
C TRACH DNA SPEC QL NAA+PROBE: NEGATIVE
N GONORRHOEA DNA SPEC QL NAA+PROBE: NEGATIVE

## 2025-03-27 ENCOUNTER — OFFICE VISIT (OUTPATIENT)
Dept: FAMILY MEDICINE CLINIC | Facility: CLINIC | Age: 25
End: 2025-03-27
Payer: COMMERCIAL

## 2025-03-27 VITALS
WEIGHT: 177.19 LBS | RESPIRATION RATE: 16 BRPM | DIASTOLIC BLOOD PRESSURE: 86 MMHG | BODY MASS INDEX: 33.45 KG/M2 | OXYGEN SATURATION: 97 % | HEART RATE: 90 BPM | SYSTOLIC BLOOD PRESSURE: 118 MMHG | HEIGHT: 61.02 IN | TEMPERATURE: 99 F

## 2025-03-27 DIAGNOSIS — G47.30 SLEEP APNEA, UNSPECIFIED TYPE: ICD-10-CM

## 2025-03-27 DIAGNOSIS — Z23 NEED FOR TDAP VACCINATION: ICD-10-CM

## 2025-03-27 DIAGNOSIS — Z23 NEED FOR HEPATITIS B VACCINATION: ICD-10-CM

## 2025-03-27 DIAGNOSIS — Z00.00 WELL ADULT EXAM: Primary | ICD-10-CM

## 2025-03-27 DIAGNOSIS — E55.9 HYPOVITAMINOSIS D: ICD-10-CM

## 2025-04-07 ENCOUNTER — LAB ENCOUNTER (OUTPATIENT)
Dept: LAB | Age: 25
End: 2025-04-07
Attending: FAMILY MEDICINE
Payer: COMMERCIAL

## 2025-04-07 DIAGNOSIS — E55.9 HYPOVITAMINOSIS D: ICD-10-CM

## 2025-04-07 DIAGNOSIS — Z00.00 WELL ADULT EXAM: ICD-10-CM

## 2025-04-07 DIAGNOSIS — Z11.3 ROUTINE SCREENING FOR STI (SEXUALLY TRANSMITTED INFECTION): ICD-10-CM

## 2025-04-07 LAB
ALBUMIN SERPL-MCNC: 5.2 G/DL (ref 3.2–4.8)
ALBUMIN/GLOB SERPL: 1.7 {RATIO} (ref 1–2)
ALP LIVER SERPL-CCNC: 58 U/L
ALT SERPL-CCNC: 15 U/L
ANION GAP SERPL CALC-SCNC: 11 MMOL/L (ref 0–18)
AST SERPL-CCNC: 16 U/L (ref ?–34)
BASOPHILS # BLD AUTO: 0.05 X10(3) UL (ref 0–0.2)
BASOPHILS NFR BLD AUTO: 0.6 %
BILIRUB SERPL-MCNC: 0.7 MG/DL (ref 0.3–1.2)
BUN BLD-MCNC: 12 MG/DL (ref 9–23)
CALCIUM BLD-MCNC: 10.1 MG/DL (ref 8.7–10.6)
CHLORIDE SERPL-SCNC: 105 MMOL/L (ref 98–112)
CHOLEST SERPL-MCNC: 175 MG/DL (ref ?–200)
CO2 SERPL-SCNC: 24 MMOL/L (ref 21–32)
CREAT BLD-MCNC: 0.85 MG/DL
EGFRCR SERPLBLD CKD-EPI 2021: 98 ML/MIN/1.73M2 (ref 60–?)
EOSINOPHIL # BLD AUTO: 0.15 X10(3) UL (ref 0–0.7)
EOSINOPHIL NFR BLD AUTO: 1.7 %
ERYTHROCYTE [DISTWIDTH] IN BLOOD BY AUTOMATED COUNT: 12.4 %
GLOBULIN PLAS-MCNC: 3 G/DL (ref 2–3.5)
GLUCOSE BLD-MCNC: 89 MG/DL (ref 70–99)
HBV SURFACE AG SER-ACNC: 0.32 [IU]/L
HBV SURFACE AG SERPL QL IA: NONREACTIVE
HCT VFR BLD AUTO: 39 %
HCV AB SERPL QL IA: NONREACTIVE
HDLC SERPL-MCNC: 34 MG/DL (ref 40–59)
HGB BLD-MCNC: 13.2 G/DL
IMM GRANULOCYTES # BLD AUTO: 0.03 X10(3) UL (ref 0–1)
IMM GRANULOCYTES NFR BLD: 0.3 %
LDLC SERPL CALC-MCNC: 116 MG/DL (ref ?–100)
LYMPHOCYTES # BLD AUTO: 3.13 X10(3) UL (ref 1–4)
LYMPHOCYTES NFR BLD AUTO: 34.5 %
MCH RBC QN AUTO: 29.7 PG (ref 26–34)
MCHC RBC AUTO-ENTMCNC: 33.8 G/DL (ref 31–37)
MCV RBC AUTO: 87.8 FL
MONOCYTES # BLD AUTO: 0.51 X10(3) UL (ref 0.1–1)
MONOCYTES NFR BLD AUTO: 5.6 %
NEUTROPHILS # BLD AUTO: 5.19 X10 (3) UL (ref 1.5–7.7)
NEUTROPHILS # BLD AUTO: 5.19 X10(3) UL (ref 1.5–7.7)
NEUTROPHILS NFR BLD AUTO: 57.3 %
NONHDLC SERPL-MCNC: 141 MG/DL (ref ?–130)
OSMOLALITY SERPL CALC.SUM OF ELEC: 289 MOSM/KG (ref 275–295)
PLATELET # BLD AUTO: 452 10(3)UL (ref 150–450)
POTASSIUM SERPL-SCNC: 3.8 MMOL/L (ref 3.5–5.1)
PROT SERPL-MCNC: 8.2 G/DL (ref 5.7–8.2)
RBC # BLD AUTO: 4.44 X10(6)UL
SODIUM SERPL-SCNC: 140 MMOL/L (ref 136–145)
T PALLIDUM AB SER QL IA: NONREACTIVE
T4 FREE SERPL-MCNC: 1.2 NG/DL (ref 0.8–1.7)
TRIGL SERPL-MCNC: 138 MG/DL (ref 30–149)
TSI SER-ACNC: 1.08 UIU/ML (ref 0.55–4.78)
VIT D+METAB SERPL-MCNC: 17.4 NG/ML (ref 30–100)
VLDLC SERPL CALC-MCNC: 24 MG/DL (ref 0–30)
WBC # BLD AUTO: 9.1 X10(3) UL (ref 4–11)

## 2025-04-07 PROCEDURE — 86780 TREPONEMA PALLIDUM: CPT

## 2025-04-07 PROCEDURE — 86803 HEPATITIS C AB TEST: CPT

## 2025-04-07 PROCEDURE — 80053 COMPREHEN METABOLIC PANEL: CPT

## 2025-04-07 PROCEDURE — 82306 VITAMIN D 25 HYDROXY: CPT

## 2025-04-07 PROCEDURE — 85025 COMPLETE CBC W/AUTO DIFF WBC: CPT

## 2025-04-07 PROCEDURE — 84443 ASSAY THYROID STIM HORMONE: CPT

## 2025-04-07 PROCEDURE — 87389 HIV-1 AG W/HIV-1&-2 AB AG IA: CPT

## 2025-04-07 PROCEDURE — 36415 COLL VENOUS BLD VENIPUNCTURE: CPT

## 2025-04-07 PROCEDURE — 87340 HEPATITIS B SURFACE AG IA: CPT

## 2025-04-07 PROCEDURE — 84439 ASSAY OF FREE THYROXINE: CPT

## 2025-04-07 PROCEDURE — 80061 LIPID PANEL: CPT

## 2025-04-09 DIAGNOSIS — R79.89 ABNORMAL CBC: ICD-10-CM

## 2025-04-09 DIAGNOSIS — E55.9 HYPOVITAMINOSIS D: Primary | ICD-10-CM

## 2025-04-28 ENCOUNTER — TELEPHONE (OUTPATIENT)
Dept: FAMILY MEDICINE CLINIC | Facility: CLINIC | Age: 25
End: 2025-04-28

## 2025-04-28 ENCOUNTER — NURSE ONLY (OUTPATIENT)
Dept: FAMILY MEDICINE CLINIC | Facility: CLINIC | Age: 25
End: 2025-04-28
Payer: COMMERCIAL

## 2025-04-28 DIAGNOSIS — Z78.9 HEPATITIS B VACCINATION STATUS UNKNOWN: Primary | ICD-10-CM

## 2025-04-28 PROCEDURE — 90746 HEPB VACCINE 3 DOSE ADULT IM: CPT | Performed by: FAMILY MEDICINE

## 2025-04-28 PROCEDURE — 90471 IMMUNIZATION ADMIN: CPT | Performed by: FAMILY MEDICINE

## 2025-04-28 NOTE — TELEPHONE ENCOUNTER
Pt coming for 3rd hep dose:    Future Appointments   Date Time Provider Department Center   6/17/2025  5:10 PM [facility] ULTRASOUND [facility]   10/29/2025 10:00 AM [facility] NURSE PRANEETH Haile

## 2025-04-28 NOTE — TELEPHONE ENCOUNTER
Per office visit 03/27/25  \"Patient Instructions: Repeat hep b vaccine in 1 month and then again in 6 months and we will check titers in 8 months\"    Future Appointments   Date Time Provider Department Center   6/17/2025  5:10 PM [facility] OB ULTRASOUND [facility]   10/29/2025 10:00 AM [facility] NURSE [facility]     3rd Hep B dose ordered      Please place orders for Hep B titers    Clarifying: patient to complete Hep B titers 2 months after completing 3rd dose?    Please advise, thank you

## 2025-04-28 NOTE — PROGRESS NOTES
Sandrita Dunbar present in office for nurse visit.  Hep B Vaccine(s) as ordered by Dr. Mehdi Younger and Expiration date verified with Lenora MA  Administered to left deltoid  VIS sheet(s) given to patient      Advised patient that 3rd dose due 6 months from first dose - patient verbalized understanding.    All questions/concerns addressed. Patient left in stable condition.

## 2025-06-17 ENCOUNTER — NURSE ONLY (OUTPATIENT)
Facility: CLINIC | Age: 25
End: 2025-06-17
Payer: COMMERCIAL

## 2025-06-17 VITALS
BODY MASS INDEX: 31.41 KG/M2 | HEIGHT: 64 IN | DIASTOLIC BLOOD PRESSURE: 88 MMHG | WEIGHT: 184 LBS | SYSTOLIC BLOOD PRESSURE: 126 MMHG

## 2025-06-17 DIAGNOSIS — Z87.42 HISTORY OF PCOS: ICD-10-CM

## 2025-06-17 DIAGNOSIS — R10.2 ADNEXAL TENDERNESS, LEFT: Primary | ICD-10-CM

## 2025-06-17 DIAGNOSIS — Z30.41 ENCOUNTER FOR BIRTH CONTROL PILLS MAINTENANCE: ICD-10-CM

## 2025-06-17 PROCEDURE — 3079F DIAST BP 80-89 MM HG: CPT

## 2025-06-17 PROCEDURE — 3008F BODY MASS INDEX DOCD: CPT

## 2025-06-17 PROCEDURE — 99212 OFFICE O/P EST SF 10 MIN: CPT

## 2025-06-17 PROCEDURE — 3074F SYST BP LT 130 MM HG: CPT

## 2025-06-17 PROCEDURE — 76830 TRANSVAGINAL US NON-OB: CPT

## 2025-06-17 RX ORDER — DROSPIRENONE AND ESTETROL 3-14.2(28)
1 KIT ORAL DAILY
Qty: 84 TABLET | Refills: 0 | COMMUNITY
Start: 2025-06-17 | End: 2025-07-17

## 2025-06-17 RX ORDER — DROSPIRENONE AND ESTETROL 3-14.2(28)
1 KIT ORAL DAILY
Qty: 84 TABLET | Refills: 3 | Status: SHIPPED | OUTPATIENT
Start: 2025-06-17 | End: 2025-07-17

## 2025-06-17 RX ORDER — DROSPIRENONE AND ESTETROL 3-14.2(28)
KIT ORAL
COMMUNITY
Start: 2025-04-13 | End: 2025-06-17

## 2025-06-17 NOTE — PROGRESS NOTES
Gynecology Office Visit    The patient was offered a medical chaperone during the visit.    Sandrita Dunbar is a 24 year old female  Patient's last menstrual period was 2025 (exact date). (contraception:  OCPs)     HPI:     Chief Complaint   Patient presents with    Ultrasound     LLQ tenderness, history PCOS and ovarian cyst    Medication Follow-Up     Pt reporting pain and increase in acne after first month of nextstellis       Sandrita presents for OCPs f/u and to discuss ultrasound done in office today for hx of PCOS and left ovarian cyst. Was seen on 3/21/25 for her annual exam and had left adnexal tenderness - see note from that date.     Was prescribed Nextellis at that time. States she took it for 1 month but had issues with picking up the medication from the pharmacy so she has not taken it since. Wants to restart. Is leaving for the [facility] soon for 5 months for work.    Chart and previous encounters reviewed.  HISTORY:  Past Medical History[1]   Past Surgical History[2]   Family History[3]   Social History: Short Social Hx on File[4]     Medications (Active prior to today's visit):  Current Medications[5]    Allergies:  Allergies[6]    Gyn:  Menarche: 15  Period Cycle (Days): 28-34  Period Duration (Days): 8-9  Period Flow: heavy  Use of Birth Control (if yes, specify type): OCP  Pap Date: 23  Pap Result Notes: negative  Follow Up Recommendation:     OB Hx:  OB History    Para Term  AB Living   2    2    SAB IAB Ectopic Multiple Live Births   2          # Outcome Date GA Lbr Aaron/2nd Weight Sex Type Anes PTL Lv   2 SAB  10w0d          1 SAB  13w0d                ROS:     10 point ROS completed and was negative, except for pertinent positive and negatives stated in the HPI.    PHYSICAL EXAM:   /88   Ht 64\"   Wt 184 lb (83.5 kg)   LMP 2025 (Exact Date)   BMI 31.58 kg/m²      Wt Readings from Last 6 Encounters:   25 184 lb (83.5 kg)    03/27/25 177 lb 3.2 oz (80.4 kg)   03/21/25 180 lb (81.6 kg)   05/04/23 182 lb (82.6 kg)   04/20/23 182 lb (82.6 kg)   04/17/23 186 lb (84.4 kg)      Gen:  Oriented, in no acute distress     ASSESSMENT/PLAN:     1. Adnexal tenderness, left  - US, Vaginal[22767]; Future    2. History of PCOS  - US, Vaginal[74972]; Future    3. Encounter for birth control pills maintenance  - Drospirenone-Estetrol (NEXTSTELLIS) 3-14.2 MG Oral Tab; Take 1 tablet by mouth daily.  Dispense: 84 tablet; Refill: 0  - Drospirenone-Estetrol (NEXTSTELLIS) 3-14.2 MG Oral Tab; Take 1 tablet by mouth daily.  Dispense: 84 tablet; Refill: 3    Ultrasound done in office today and reviewed with patient - normal    Patient provided with samples of Nextellis and rx sent to pharmacy. Patient provided with pharmacy savings card as well    Meds This Visit:  Requested Prescriptions     Signed Prescriptions Disp Refills    Drospirenone-Estetrol (NEXTSTELLIS) 3-14.2 MG Oral Tab 84 tablet 0     Sig: Take 1 tablet by mouth daily.    Drospirenone-Estetrol (NEXTSTELLIS) 3-14.2 MG Oral Tab 84 tablet 3     Sig: Take 1 tablet by mouth daily.       Imaging & Referrals:  OB/GYNE ULTRASOUND SCAN  OB/GYNE ULTRASOUND REPORT  US TRANSVAGINAL [facility] ONLY     No follow-ups on file.      Angie Nunes, RONALDO  6/17/2025  5:42 PM         This note was created by [employer] voice recognition. Errors in content may be related to improper recognition by the system; efforts to review and correct have been done but errors may still exist. Please contact me with any questions.    Note to patient and family   The 21st Century Cures Act makes medical notes available to patients in the interest of transparency.  However, please be advised that this is a medical document.  It is intended as [id number]-[id number] communication.  It is written and medical language may contain abbreviations or verbiage that are technical and unfamiliar.  It may appear blunt or direct.  Medical documents are intended  to carry relevant information, facts as evident, and the clinical opinion of the practitioner.           [1]   Past Medical History:   Abnormal uterine bleeding    Rarely had spotting (began to btwn periods in 2022)    Allergic rhinitis    Amenorrhea    Recently - skipped twice, and late twice in 2022    Anxiety    Diagnosed Mar 2022    Depression    Diagnosed with Major Depression Apr 2022    Dysmenorrhea    One of few consistent sx since I started menstruating    Dyspareunia    Has been an issue since last pregnancy    Esophageal reflux    Infertility, female    Difficulty conceiving after trying for a yr and suffered two miscarriages    Obesity    Sleep apnea    Diagnosed w JUANA May 2022, on CPAP since Feb 2023    Urinary incontinence    More frequent since May 2022   [2]   Past Surgical History:  Procedure Laterality Date    Colonoscopy  Jun 2019    Gastric Polyps Removed, Diagnosed with Gastritis + hemorrhoids    Other surgical history  Apr 2019    Rapidan Teeth Removal   [3]   Family History  Problem Relation Age of Onset    Stroke Father         Passed after going into coma from stroke    Diabetes Mother         Type 2, Gestional    Hypertension Mother         Developed after years with diabetes    Cancer Maternal Grandmother         Passed from uterine cancer    Hypertension Brother         Developed in early 20s   [4]   Social History  Socioeconomic History    Marital status:    Tobacco Use    Smoking status: Never     Passive exposure: Never    Smokeless tobacco: Never   Vaping Use    Vaping status: Never Used   Substance and Sexual Activity    Alcohol use: Not Currently    Drug use: Never    Sexual activity: Yes     Partners: Male     Birth control/protection: OCP     Social Drivers of Health     Food Insecurity: No Food Insecurity (3/27/2025)    NCSS - Food Insecurity     Worried About Running Out of Food in the Last Year: No     Ran Out of Food in the Last Year: No   Transportation Needs: No  Transportation Needs (3/27/2025)    NCSS - Transportation     Lack of Transportation: No   Housing Stability: Not At Risk (3/27/2025)    NCSS - Housing/Utilities     Has Housing: Yes     Worried About Losing Housing: No     Unable to Get Utilities: No   [5]   Current Outpatient Medications   Medication Sig Dispense Refill    Drospirenone-Estetrol (NEXTSTELLIS) 3-14.2 MG Oral Tab Take 1 tablet by mouth daily. 84 tablet 0    Drospirenone-Estetrol (NEXTSTELLIS) 3-14.2 MG Oral Tab Take 1 tablet by mouth daily. 84 tablet 3   [6]   Allergies  Allergen Reactions    Cat Hair Extract DIZZINESS, FACE FLUSHING, HIVES, ITCHING, PAIN, RASH and RESTLESSNESS    Seasonal FATIGUE, ITCHING and NAUSEA ONLY

## 2025-07-24 ENCOUNTER — PATIENT MESSAGE (OUTPATIENT)
Dept: FAMILY MEDICINE CLINIC | Facility: CLINIC | Age: 25
End: 2025-07-24